# Patient Record
Sex: FEMALE | Race: BLACK OR AFRICAN AMERICAN | NOT HISPANIC OR LATINO | Employment: UNEMPLOYED | URBAN - METROPOLITAN AREA
[De-identification: names, ages, dates, MRNs, and addresses within clinical notes are randomized per-mention and may not be internally consistent; named-entity substitution may affect disease eponyms.]

---

## 2023-04-08 LAB
BASOPHILS (10*3/UL) IN BLOOD BY AUTOMATED COUNT: 0.05 X10E9/L (ref 0–0.1)
BASOPHILS/100 LEUKOCYTES IN BLOOD BY AUTOMATED COUNT: 0.4 % (ref 0–2)
EOSINOPHILS (10*3/UL) IN BLOOD BY AUTOMATED COUNT: 0.02 X10E9/L (ref 0–0.7)
EOSINOPHILS/100 LEUKOCYTES IN BLOOD BY AUTOMATED COUNT: 0.1 % (ref 0–6)
ERYTHROCYTE DISTRIBUTION WIDTH (RATIO) BY AUTOMATED COUNT: 13.7 % (ref 11.5–14.5)
ERYTHROCYTE MEAN CORPUSCULAR HEMOGLOBIN CONCENTRATION (G/DL) BY AUTOMATED: 31.1 G/DL (ref 32–36)
ERYTHROCYTE MEAN CORPUSCULAR VOLUME (FL) BY AUTOMATED COUNT: 89 FL (ref 80–100)
ERYTHROCYTES (10*6/UL) IN BLOOD BY AUTOMATED COUNT: 3.23 X10E12/L (ref 4–5.2)
HEMATOCRIT (%) IN BLOOD BY AUTOMATED COUNT: 28.9 % (ref 36–46)
HEMOGLOBIN (G/DL) IN BLOOD: 9 G/DL (ref 12–16)
IMMATURE GRANULOCYTES/100 LEUKOCYTES IN BLOOD BY AUTOMATED COUNT: 0.4 % (ref 0–0.9)
LEUKOCYTES (10*3/UL) IN BLOOD BY AUTOMATED COUNT: 13.5 X10E9/L (ref 4.4–11.3)
LYMPHOCYTES (10*3/UL) IN BLOOD BY AUTOMATED COUNT: 1.53 X10E9/L (ref 1.2–4.8)
LYMPHOCYTES/100 LEUKOCYTES IN BLOOD BY AUTOMATED COUNT: 11.4 % (ref 13–44)
MONOCYTES (10*3/UL) IN BLOOD BY AUTOMATED COUNT: 0.89 X10E9/L (ref 0.1–1)
MONOCYTES/100 LEUKOCYTES IN BLOOD BY AUTOMATED COUNT: 6.6 % (ref 2–10)
NEUTROPHILS (10*3/UL) IN BLOOD BY AUTOMATED COUNT: 10.91 X10E9/L (ref 1.2–7.7)
NEUTROPHILS/100 LEUKOCYTES IN BLOOD BY AUTOMATED COUNT: 81.1 % (ref 40–80)
PLATELETS (10*3/UL) IN BLOOD AUTOMATED COUNT: 345 X10E9/L (ref 150–450)

## 2023-04-17 ENCOUNTER — NURSING HOME VISIT (OUTPATIENT)
Dept: POST ACUTE CARE | Facility: EXTERNAL LOCATION | Age: 63
End: 2023-04-17
Payer: COMMERCIAL

## 2023-04-17 DIAGNOSIS — A41.9 SEPSIS, DUE TO UNSPECIFIED ORGANISM, UNSPECIFIED WHETHER ACUTE ORGAN DYSFUNCTION PRESENT (MULTI): Primary | ICD-10-CM

## 2023-04-17 DIAGNOSIS — Z74.09 IMMOBILITY: ICD-10-CM

## 2023-04-17 DIAGNOSIS — N18.9 ACUTE KIDNEY INJURY SUPERIMPOSED ON CHRONIC KIDNEY DISEASE (CMS-HCC): ICD-10-CM

## 2023-04-17 DIAGNOSIS — I69.30 SEQUELA OF CEREBROVASCULAR ACCIDENT: ICD-10-CM

## 2023-04-17 DIAGNOSIS — E11.69 TYPE 2 DIABETES MELLITUS WITH OTHER SPECIFIED COMPLICATION, WITH LONG-TERM CURRENT USE OF INSULIN (MULTI): ICD-10-CM

## 2023-04-17 DIAGNOSIS — M62.81 GENERALIZED MUSCLE WEAKNESS: ICD-10-CM

## 2023-04-17 DIAGNOSIS — R47.01 NONVERBAL: ICD-10-CM

## 2023-04-17 DIAGNOSIS — F01.C11 SEVERE VASCULAR DEMENTIA WITH AGITATION (MULTI): ICD-10-CM

## 2023-04-17 DIAGNOSIS — Z79.4 TYPE 2 DIABETES MELLITUS WITH OTHER SPECIFIED COMPLICATION, WITH LONG-TERM CURRENT USE OF INSULIN (MULTI): ICD-10-CM

## 2023-04-17 DIAGNOSIS — Z86.73 HISTORY OF MULTIPLE STROKES: ICD-10-CM

## 2023-04-17 DIAGNOSIS — I10 BENIGN ESSENTIAL HTN: ICD-10-CM

## 2023-04-17 DIAGNOSIS — S31.000S SACRAL WOUND, SEQUELA: ICD-10-CM

## 2023-04-17 DIAGNOSIS — I69.391 DYSPHAGIA DUE TO RECENT CEREBROVASCULAR ACCIDENT: ICD-10-CM

## 2023-04-17 DIAGNOSIS — E78.5 HYPERLIPIDEMIA, UNSPECIFIED HYPERLIPIDEMIA TYPE: ICD-10-CM

## 2023-04-17 DIAGNOSIS — N17.9 ACUTE KIDNEY INJURY SUPERIMPOSED ON CHRONIC KIDNEY DISEASE (CMS-HCC): ICD-10-CM

## 2023-04-17 DIAGNOSIS — E46 MALNUTRITION, UNSPECIFIED TYPE (MULTI): ICD-10-CM

## 2023-04-17 PROCEDURE — 99310 SBSQ NF CARE HIGH MDM 45: CPT | Performed by: NURSE PRACTITIONER

## 2023-04-17 NOTE — LETTER
Patient: Kira Cha  : 1960    Encounter Date: 2023    Name: Kira Cha    YOB: 1960    Code Status: Full Code    Chief Complaint:   New patient; initial visit;   Sepsis; etc.....      HPI    HOSPITAL COURSE:  62-year-old female with past medical history of stroke, vascular dementia, type 2 diabetes, hypertension, and CKD.   She was sent to [facility] with fever and hypoxia ON 23.    Per EMS they were called because the patient was hypoxic.  EMS reported that the patient was on 3 L of oxygen via nasal cannula on their arrival and was satting in the mid 80s.  They placed her on a nonrebreather and her oxygenation improved.  Per EMS patient also had a recent admission to another hospital for change in mental status and concern for stroke.  EMS reports that the patient is nonverbal.  Patient provides no history on arrival to the ER.  The patient also presented to the ER on 3/22/2023 from [facility] for decreased responsiveness.  CT CTA revealed left ICA stenosis.  Otherwise no acute process.  Patient was then transferred to [facility] per recommendation from neurology.  MRI during the hospital stay did not have evidence of an acute process per radiology.  Patient was diagnosed with acute metabolic encephalopathy.  Corpak was placed during her admission.   On 2023 she was febrile with a temperature of 38.8 Celsius and heart rate of 124.  There was a concern for sepsis.  IV antibiotics were initiated.  There was suspicion that the source of infection was her wound infection.  It was not thought that she had necrotizing fasciitis.  Patient's lactate increased mildly, so IV fluid resuscitation was initiated.  She was admitted to the floor for antibiotics and other treatments.  CT of the abdomen and pelvis was performed.   Infectious disease was consulted.    Patient admitted to the [facility] on 23 for skilled services    [facility] and chronic  diagnoses include:    Sepsis:  Treated with IV antibiotics.  Treated with vancomycin and cefepime IV.  Fever and leukocytosis were secondary to sepsis.  Worsening of sacral decubitus ulcer, infectious disease was consulted.  Patient is in bed.  She is nonverbal.   No agitation reported.   No fevers reported since she admitted to the [facility].  No cough or oxygen desaturations reported.     Sequela of CVA with residual weakness; multiple strokes with deconditioning;  dysphagia; patient nonverbal:  Patient has corepack in place; has left-sided weakness and contracture.    ROBERT on CKD:   Treated with IV fluids    Benign essential hypertension:  On metoprolol.  Recent /79.    ICA stenosis: Was found during neuro work-up at [facility]    Unstageable sacral decubitus ulcer:  General surgery was consulted for sacral decubitus ulcer that is unstageable due to overlying eschar.  Wound was not draining.  Wound size was documented approximately as 10 cm x 8 cm.  Debridement of the wound was discussed there was not anybody to contact for consent.  Plan for wound was daily wound dressing change with Dakin wet-to-dry twice a day possible debridement.  Currently at the [facility] and is being followed by in-house wound team.    Type 2 diabetes:   On insulin glargine 15 units SQ daily and insulin lispro sliding scale.  Recent accuchecks:   282 mg/dL, 210 mg/dL, 234 mg/dL.   1/15/23 HA1c 9.4 %.    Vascular dementia; agitation:  On risperidone.  No agitation reported since patient has arrived to the [facility].    Malnutrition of mild degree:  On Corpak feeds via right nostril.  On Isosource 1.5 continuous feeds at 50 cc an hour.    Immobility; generalized muscle weakness:  Patient is bedbound.  Unable to ambulate.    Hyperlipidemia:  On atorvastatin.        Reviewed [facility] hospital records.  Reviewed  EMR.  Reviewed medical, social, surgical and family history.  Reviewed all current medications and  performed medication reconciliation.  Reviewed vital signs and blood work results.   Reviewed [facility] Care Documentation  Discussed patient with nursing.     ROS: Limited ROS due to patient's mentation and she is nonverbal; Negative unless noted in HPI.     Surgical History   · History of Pilonidal Cyst Resection    Family History  Mother    · Family history of    · Family history of cataracts (V19.19) (Z83.518)   · Family history of diabetes mellitus (V18.0) (Z83.3)   · Family history of glaucoma (V19.11) (Z83.511)   · Family history of hypertension (V17.49) (Z82.49)  Father    · Family history of    · Family history of diabetes mellitus (V18.0) (Z83.3)   · Family history of hypertension (V17.49) (Z82.49)  Sister    · Family history of diabetes mellitus (V18.0) (Z83.3)   · Family history of hypertension (V17.49) (Z82.49)  Brother    · Family history of    · Family history of cardiac disorder (V17.49) (Z82.49)   · Family history of colon cancer (V16.0) (Z80.0)   · multiple 1st degree relatives   · Family history of diabetes mellitus (V18.0) (Z83.3)   · Family history of hypertension (V17.49) (Z82.49)   · Family history of skin cancer (V16.8) (Z80.8)  Other    · Family history of cardiac disorder (V17.49) (Z82.49)   · Family history of colon cancer (V16.0) (Z80.0)   · multiple 1st degree relatives   · Family history of diabetes mellitus (V18.0) (Z83.3)   · Family history of hypertension (V17.49) (Z82.49)   · Family history of malignant neoplasm (V16.9) (Z80.9)  Unknown    · Family history of cardiac disorder (V17.49) (Z82.49)   · Family history of diabetes mellitus (V18.0) (Z83.3)   · Family history of hypertension (V17.49) (Z82.49)   · Family history of kidney disease (V18.69) (Z84.1)   · Family history of liver disease (V18.59) (Z83.79)   · Family history of malignant neoplasm (V16.9) (Z80.9)    Social History   · Alcohol use (V49.89) (Z78.9)   · Current every day smoker (305.1)  "(F17.200)   · Does not exercise (V69.0) (Z72.3)   · Illicit drug use (305.90) (F19.90)   · History of No alcohol use   · History of Social alcohol use    Allergies  Medication    · Penicillins      Past Medical History:   Diagnosis Date   • Cerebral infarction, unspecified (CMS/HCC) 08/25/2017    Left pontine stroke   • Personal history of other diseases of the circulatory system     History of hypertension   • Personal history of other diseases of the digestive system     History of dental problems   • Personal history of other mental and behavioral disorders     History of depression   • Sleep disorder, unspecified     Sleep disturbances   • Unspecified disorder of nose and nasal sinuses     Sinus trouble           Past Surgical History:   Procedure Laterality Date   • MR HEAD ANGIO WO IV CONTRAST  7/14/2017    MR HEAD ANGIO WO IV CONTRAST 7/14/2017 [facility] CLINICAL LEGACY   • MR HEAD ANGIO WO IV CONTRAST  11/29/2022    MR HEAD ANGIO WO IV CONTRAST 11/29/2022 DOCTOR OFFICE LEGACY   • MR NECK ANGIO WO IV CONTRAST  7/14/2017    MR NECK ANGIO WO IV CONTRAST 7/14/2017 [facility] CLINICAL LEGACY   • MR NECK ANGIO WO IV CONTRAST  11/29/2022    MR NECK ANGIO WO IV CONTRAST 11/29/2022 DOCTOR OFFICE LEGACY   • PILONIDAL CYST DRAINAGE  09/27/2017    Pilonidal Cyst Resection          Lab Results   Component Value Date    WBC 13.5 (H) 04/08/2023    HGB 9.0 (L) 04/08/2023    HCT 28.9 (L) 04/08/2023     04/08/2023    CHOL 313 (H) 11/28/2022    TRIG 248 (H) 11/28/2022    HDL 52.7 11/28/2022    ALT 8 11/28/2022    AST 8 (L) 11/28/2022     01/17/2023    K 3.7 01/17/2023     01/17/2023    CREATININE 2.07 (H) 01/17/2023    BUN 39 (H) 01/17/2023    CO2 27 01/17/2023    TSH 1.91 01/10/2023    INR 1.0 11/28/2022    HGBA1C 9.4 (A) 01/15/2023         /79   Pulse 89   Temp 36.6 °C (97.8 °F)   Resp 20   Ht 1.626 m (5' 4\")   Wt 84.8 kg (186 lb 15.9 oz)   SpO2 98%   BMI 32.10 kg/m²      Physical Exam  Vitals and " nursing note reviewed.   Constitutional:       Appearance: She is obese. She is ill-appearing.      Comments: Nonverbal   HENT:      Head: Normocephalic.      Right Ear: External ear normal.      Left Ear: External ear normal.      Nose: Nose normal.      Mouth/Throat:      Mouth: Mucous membranes are moist.      Pharynx: Oropharynx is clear.   Eyes:      Extraocular Movements: Extraocular movements intact.      Conjunctiva/sclera: Conjunctivae normal.      Pupils: Pupils are equal, round, and reactive to light.   Cardiovascular:      Rate and Rhythm: Normal rate and regular rhythm.      Pulses: Normal pulses.      Heart sounds: Normal heart sounds.   Pulmonary:      Effort: Pulmonary effort is normal.      Breath sounds: Normal breath sounds.   Abdominal:      General: Bowel sounds are normal.      Palpations: Abdomen is soft.   Musculoskeletal:      Cervical back: Normal range of motion and neck supple.      Comments: Generalized muscle weakness.    Immobility.   Skin:     General: Skin is warm and dry.      Comments: Sacral wound--see in house wound team notes in [facility]   Neurological:      Mental Status: She is lethargic and disoriented.      Motor: Weakness present.      Coordination: Coordination abnormal.      Gait: Gait abnormal.      Comments: Nonverbal.    Left side contracture and weakness.   Psychiatric:         Mood and Affect: Affect is inappropriate.         Speech: She is noncommunicative.         Cognition and Memory: Cognition is impaired. Memory is impaired.         Judgment: Judgment is inappropriate.          Assessment/Plan     Sepsis:  Treated with IV antibiotics.  Treated with vancomycin and cefepime IV.  Monitor for fevers, leukocytosis.    Sequela of CVA with residual weakness; multiple strokes with deconditioning;  dysphagia; patient nonverbal:  Continue skilled services PT/OT/ST at the [facility].  Patient has corepack in place; has left-sided weakness and contracture.    ROBERT on CKD:   Treated  with IV fluids in the hospital.  Monitor renal function.    Benign essential hypertension:  Continue metoprolol.  Monitor Bps.    ICA stenosis:   Was found during neuro work-up at [facility].  Follow up with neurology.    Unstageable sacral decubitus ulcer:  General surgery was consulted for sacral decubitus ulcer that is unstageable due to overlying eschar.  Wound was not draining.  Wound size was documented approximately as 10 cm x 8 cm.  Debridement of the wound was discussed there was not anybody to contact for consent.  Plan for wound was daily wound dressing change with Dakin wet-to-dry twice a day possible debridement.  Currently at the [facility] and is being followed by in-house wound team.    Type 2 diabetes:   Continue insulin glargine 15 units SQ daily and insulin lispro sliding scale.  Monitor accuchecks.  Monitor Ha1c.     Vascular dementia; agitation:  Continue risperidone.  Monitor for agitation.    Malnutrition of mild degree:  On Corpak feeds via right nostril.  Continue Isosource 1.5 continuous feeds at 50 cc an hour.    Immobility; generalized muscle weakness:  Patient is bedbound.  Unable to ambulate.  Fall prevention strategies.     Hyperlipidemia:  Continue atorvastatin.        Problem List Items Addressed This Visit    None  Visit Diagnoses       Sepsis, due to unspecified organism, unspecified whether acute organ dysfunction present (CMS/HCC)    -  Primary    Sequela of cerebrovascular accident        Dysphagia due to recent cerebrovascular accident        Acute kidney injury superimposed on chronic kidney disease (CMS/HCC)        Benign essential HTN        Sacral wound, sequela        Type 2 diabetes mellitus with other specified complication, with long-term current use of insulin (CMS/HCC)        Severe vascular dementia with agitation (CMS/HCC)        Nonverbal        Malnutrition, unspecified type (CMS/HCC)        History of multiple strokes        Immobility         Generalized muscle weakness        Hyperlipidemia, unspecified hyperlipidemia type                RAMIRO Ariza       Electronically Signed By: RAMIRO Ariza   4/20/23 10:09 PM

## 2023-04-20 VITALS
DIASTOLIC BLOOD PRESSURE: 79 MMHG | RESPIRATION RATE: 20 BRPM | HEIGHT: 64 IN | WEIGHT: 186.99 LBS | TEMPERATURE: 97.8 F | SYSTOLIC BLOOD PRESSURE: 148 MMHG | HEART RATE: 89 BPM | BODY MASS INDEX: 31.92 KG/M2 | OXYGEN SATURATION: 98 %

## 2023-04-21 NOTE — PROGRESS NOTES
Name: Kira Cha    YOB: 1960    Code Status: Full Code    Chief Complaint:   New patient; initial visit;   Sepsis; etc.....      HPI    HOSPITAL COURSE:  62-year-old female with past medical history of stroke, vascular dementia, type 2 diabetes, hypertension, and CKD.   She was sent to [facility] with fever and hypoxia ON 4/9/23.    Per EMS they were called because the patient was hypoxic.  EMS reported that the patient was on 3 L of oxygen via nasal cannula on their arrival and was satting in the mid 80s.  They placed her on a nonrebreather and her oxygenation improved.  Per EMS patient also had a recent admission to another hospital for change in mental status and concern for stroke.  EMS reports that the patient is nonverbal.  Patient provides no history on arrival to the ER.  The patient also presented to the ER on 3/22/2023 from [facility] for decreased responsiveness.  CT CTA revealed left ICA stenosis.  Otherwise no acute process.  Patient was then transferred to [facility] per recommendation from neurology.  MRI during the hospital stay did not have evidence of an acute process per radiology.  Patient was diagnosed with acute metabolic encephalopathy.  Corpak was placed during her admission.   On 4/9/2023 she was febrile with a temperature of 38.8 Celsius and heart rate of 124.  There was a concern for sepsis.  IV antibiotics were initiated.  There was suspicion that the source of infection was her wound infection.  It was not thought that she had necrotizing fasciitis.  Patient's lactate increased mildly, so IV fluid resuscitation was initiated.  She was admitted to the floor for antibiotics and other treatments.  CT of the abdomen and pelvis was performed.   Infectious disease was consulted.    Patient admitted to the [facility] on 4/14/23 for skilled services    Hospital and chronic diagnoses include:    Sepsis:  Treated with IV antibiotics.  Treated  with vancomycin and cefepime IV.  Fever and leukocytosis were secondary to sepsis.  Worsening of sacral decubitus ulcer, infectious disease was consulted.  Patient is in bed.  She is nonverbal.   No agitation reported.   No fevers reported since she admitted to the [facility].  No cough or oxygen desaturations reported.     Sequela of CVA with residual weakness; multiple strokes with deconditioning;  dysphagia; patient nonverbal:  Patient has corepack in place; has left-sided weakness and contracture.    ROBERT on CKD:   Treated with IV fluids    Benign essential hypertension:  On metoprolol.  Recent /79.    ICA stenosis: Was found during neuro work-up at [facility]    Unstageable sacral decubitus ulcer:  General surgery was consulted for sacral decubitus ulcer that is unstageable due to overlying eschar.  Wound was not draining.  Wound size was documented approximately as 10 cm x 8 cm.  Debridement of the wound was discussed there was not anybody to contact for consent.  Plan for wound was daily wound dressing change with Dakin wet-to-dry twice a day possible debridement.  Currently at the [facility] and is being followed by in-house wound team.    Type 2 diabetes:   On insulin glargine 15 units SQ daily and insulin lispro sliding scale.  Recent accuchecks:   282 mg/dL, 210 mg/dL, 234 mg/dL.   1/15/23 HA1c 9.4 %.    Vascular dementia; agitation:  On risperidone.  No agitation reported since patient has arrived to the [facility].    Malnutrition of mild degree:  On Corpak feeds via right nostril.  On Isosource 1.5 continuous feeds at 50 cc an hour.    Immobility; generalized muscle weakness:  Patient is bedbound.  Unable to ambulate.    Hyperlipidemia:  On atorvastatin.        Reviewed [facility] hospital records.  Reviewed  EMR.  Reviewed medical, social, surgical and family history.  Reviewed all current medications and performed medication reconciliation.  Reviewed vital signs and blood  work results.   Reviewed [facility] Documentation  Discussed patient with nursing.     ROS: Limited ROS due to patient's mentation and she is nonverbal; Negative unless noted in HPI.     Surgical History   · History of Pilonidal Cyst Resection    Family History  Mother    · Family history of    · Family history of cataracts (V19.19) (Z83.518)   · Family history of diabetes mellitus (V18.0) (Z83.3)   · Family history of glaucoma (V19.11) (Z83.511)   · Family history of hypertension (V17.49) (Z82.49)  Father    · Family history of    · Family history of diabetes mellitus (V18.0) (Z83.3)   · Family history of hypertension (V17.49) (Z82.49)  Sister    · Family history of diabetes mellitus (V18.0) (Z83.3)   · Family history of hypertension (V17.49) (Z82.49)  Brother    · Family history of    · Family history of cardiac disorder (V17.49) (Z82.49)   · Family history of colon cancer (V16.0) (Z80.0)   · multiple 1st degree relatives   · Family history of diabetes mellitus (V18.0) (Z83.3)   · Family history of hypertension (V17.49) (Z82.49)   · Family history of skin cancer (V16.8) (Z80.8)  Other    · Family history of cardiac disorder (V17.49) (Z82.49)   · Family history of colon cancer (V16.0) (Z80.0)   · multiple 1st degree relatives   · Family history of diabetes mellitus (V18.0) (Z83.3)   · Family history of hypertension (V17.49) (Z82.49)   · Family history of malignant neoplasm (V16.9) (Z80.9)  Unknown    · Family history of cardiac disorder (V17.49) (Z82.49)   · Family history of diabetes mellitus (V18.0) (Z83.3)   · Family history of hypertension (V17.49) (Z82.49)   · Family history of kidney disease (V18.69) (Z84.1)   · Family history of liver disease (V18.59) (Z83.79)   · Family history of malignant neoplasm (V16.9) (Z80.9)    Social History   · Alcohol use (V49.89) (Z78.9)   · Current every day smoker (305.1) (F17.200)   · Does not exercise (V69.0) (Z72.3)   · Illicit drug use  "(305.90) (F19.90)   · History of No alcohol use   · History of Social alcohol use    Allergies  Medication    · Penicillins      Past Medical History:   Diagnosis Date    Cerebral infarction, unspecified (CMS/HCC) 08/25/2017    Left pontine stroke    Personal history of other diseases of the circulatory system     History of hypertension    Personal history of other diseases of the digestive system     History of dental problems    Personal history of other mental and behavioral disorders     History of depression    Sleep disorder, unspecified     Sleep disturbances    Unspecified disorder of nose and nasal sinuses     Sinus trouble           Past Surgical History:   Procedure Laterality Date    MR HEAD ANGIO WO IV CONTRAST  7/14/2017    MR HEAD ANGIO WO IV CONTRAST 7/14/2017 [facility] CLINICAL LEGACY    MR HEAD ANGIO WO IV CONTRAST  11/29/2022    MR HEAD ANGIO WO IV CONTRAST 11/29/2022 DOCTOR OFFICE LEGACY    MR NECK ANGIO WO IV CONTRAST  7/14/2017    MR NECK ANGIO WO IV CONTRAST 7/14/2017 [facility] CLINICAL LEGACY    MR NECK ANGIO WO IV CONTRAST  11/29/2022    MR NECK ANGIO WO IV CONTRAST 11/29/2022 DOCTOR OFFICE LEGACY    PILONIDAL CYST DRAINAGE  09/27/2017    Pilonidal Cyst Resection          Lab Results   Component Value Date    WBC 13.5 (H) 04/08/2023    HGB 9.0 (L) 04/08/2023    HCT 28.9 (L) 04/08/2023     04/08/2023    CHOL 313 (H) 11/28/2022    TRIG 248 (H) 11/28/2022    HDL 52.7 11/28/2022    ALT 8 11/28/2022    AST 8 (L) 11/28/2022     01/17/2023    K 3.7 01/17/2023     01/17/2023    CREATININE 2.07 (H) 01/17/2023    BUN 39 (H) 01/17/2023    CO2 27 01/17/2023    TSH 1.91 01/10/2023    INR 1.0 11/28/2022    HGBA1C 9.4 (A) 01/15/2023         /79   Pulse 89   Temp 36.6 °C (97.8 °F)   Resp 20   Ht 1.626 m (5' 4\")   Wt 84.8 kg (186 lb 15.9 oz)   SpO2 98%   BMI 32.10 kg/m²      Physical Exam  Vitals and nursing note reviewed.   Constitutional:       Appearance: She is obese. She is " ill-appearing.      Comments: Nonverbal   HENT:      Head: Normocephalic.      Right Ear: External ear normal.      Left Ear: External ear normal.      Nose: Nose normal.      Mouth/Throat:      Mouth: Mucous membranes are moist.      Pharynx: Oropharynx is clear.   Eyes:      Extraocular Movements: Extraocular movements intact.      Conjunctiva/sclera: Conjunctivae normal.      Pupils: Pupils are equal, round, and reactive to light.   Cardiovascular:      Rate and Rhythm: Normal rate and regular rhythm.      Pulses: Normal pulses.      Heart sounds: Normal heart sounds.   Pulmonary:      Effort: Pulmonary effort is normal.      Breath sounds: Normal breath sounds.   Abdominal:      General: Bowel sounds are normal.      Palpations: Abdomen is soft.   Musculoskeletal:      Cervical back: Normal range of motion and neck supple.      Comments: Generalized muscle weakness.    Immobility.   Skin:     General: Skin is warm and dry.      Comments: Sacral wound--see in house wound team notes in [facility]   Neurological:      Mental Status: She is lethargic and disoriented.      Motor: Weakness present.      Coordination: Coordination abnormal.      Gait: Gait abnormal.      Comments: Nonverbal.    Left side contracture and weakness.   Psychiatric:         Mood and Affect: Affect is inappropriate.         Speech: She is noncommunicative.         Cognition and Memory: Cognition is impaired. Memory is impaired.         Judgment: Judgment is inappropriate.          Assessment/Plan      Sepsis:  Treated with IV antibiotics.  Treated with vancomycin and cefepime IV.  Monitor for fevers, leukocytosis.    Sequela of CVA with residual weakness; multiple strokes with deconditioning;  dysphagia; patient nonverbal:  Continue skilled services PT/OT/ST at the [facility].  Patient has corepack in place; has left-sided weakness and contracture.    ROBERT on CKD:   Treated with IV fluids in the hospital.  Monitor renal function.    Benign essential  hypertension:  Continue metoprolol.  Monitor Bps.    ICA stenosis:   Was found during neuro work-up at [facility].  Follow up with neurology.    Unstageable sacral decubitus ulcer:  General surgery was consulted for sacral decubitus ulcer that is unstageable due to overlying eschar.  Wound was not draining.  Wound size was documented approximately as 10 cm x 8 cm.  Debridement of the wound was discussed there was not anybody to contact for consent.  Plan for wound was daily wound dressing change with Dakin wet-to-dry twice a day possible debridement.  Currently at the [facility] and is being followed by in-house wound team.    Type 2 diabetes:   Continue insulin glargine 15 units SQ daily and insulin lispro sliding scale.  Monitor accuchecks.  Monitor Ha1c.     Vascular dementia; agitation:  Continue risperidone.  Monitor for agitation.    Malnutrition of mild degree:  On Corpak feeds via right nostril.  Continue Isosource 1.5 continuous feeds at 50 cc an hour.    Immobility; generalized muscle weakness:  Patient is bedbound.  Unable to ambulate.  Fall prevention strategies.     Hyperlipidemia:  Continue atorvastatin.        Problem List Items Addressed This Visit    None  Visit Diagnoses       Sepsis, due to unspecified organism, unspecified whether acute organ dysfunction present (CMS/HCC)    -  Primary    Sequela of cerebrovascular accident        Dysphagia due to recent cerebrovascular accident        Acute kidney injury superimposed on chronic kidney disease (CMS/HCC)        Benign essential HTN        Sacral wound, sequela        Type 2 diabetes mellitus with other specified complication, with long-term current use of insulin (CMS/HCC)        Severe vascular dementia with agitation (CMS/HCC)        Nonverbal        Malnutrition, unspecified type (CMS/HCC)        History of multiple strokes        Immobility        Generalized muscle weakness        Hyperlipidemia, unspecified hyperlipidemia type                 Meaghan Garcia, APRN-CNP

## 2023-04-24 ENCOUNTER — NURSING HOME VISIT (OUTPATIENT)
Dept: POST ACUTE CARE | Facility: EXTERNAL LOCATION | Age: 63
End: 2023-04-24
Payer: COMMERCIAL

## 2023-04-24 DIAGNOSIS — I69.391 DYSPHAGIA DUE TO RECENT CEREBROVASCULAR ACCIDENT: ICD-10-CM

## 2023-04-24 DIAGNOSIS — Z86.73 HISTORY OF MULTIPLE STROKES: ICD-10-CM

## 2023-04-24 DIAGNOSIS — E11.69 TYPE 2 DIABETES MELLITUS WITH OTHER SPECIFIED COMPLICATION, WITH LONG-TERM CURRENT USE OF INSULIN (MULTI): Primary | ICD-10-CM

## 2023-04-24 DIAGNOSIS — N18.9 ACUTE KIDNEY INJURY SUPERIMPOSED ON CHRONIC KIDNEY DISEASE (CMS-HCC): ICD-10-CM

## 2023-04-24 DIAGNOSIS — R53.1 LEFT-SIDED WEAKNESS: ICD-10-CM

## 2023-04-24 DIAGNOSIS — Z79.4 TYPE 2 DIABETES MELLITUS WITH OTHER SPECIFIED COMPLICATION, WITH LONG-TERM CURRENT USE OF INSULIN (MULTI): Primary | ICD-10-CM

## 2023-04-24 DIAGNOSIS — R47.01 NONVERBAL: ICD-10-CM

## 2023-04-24 DIAGNOSIS — F01.C11 SEVERE VASCULAR DEMENTIA WITH AGITATION (MULTI): ICD-10-CM

## 2023-04-24 DIAGNOSIS — N17.9 ACUTE KIDNEY INJURY SUPERIMPOSED ON CHRONIC KIDNEY DISEASE (CMS-HCC): ICD-10-CM

## 2023-04-24 DIAGNOSIS — M62.81 GENERALIZED MUSCLE WEAKNESS: ICD-10-CM

## 2023-04-24 DIAGNOSIS — I69.30 SEQUELA OF CEREBROVASCULAR ACCIDENT: ICD-10-CM

## 2023-04-24 DIAGNOSIS — E46 MALNUTRITION, UNSPECIFIED TYPE (MULTI): ICD-10-CM

## 2023-04-24 DIAGNOSIS — Z74.09 IMMOBILITY: ICD-10-CM

## 2023-04-24 DIAGNOSIS — S31.000S SACRAL WOUND, SEQUELA: ICD-10-CM

## 2023-04-24 DIAGNOSIS — I10 BENIGN ESSENTIAL HTN: ICD-10-CM

## 2023-04-24 PROCEDURE — 99309 SBSQ NF CARE MODERATE MDM 30: CPT | Performed by: NURSE PRACTITIONER

## 2023-04-24 NOTE — LETTER
Patient: Kira Cha  : 1960    Encounter Date: 2023    Name: Kira Cha    YOB: 1960    Code Status: Full Code    Chief Complaint:   Follow up on DM 2; etc....    Patient admitted to the [facility] on 23 for skilled services    HPI      62-year-old female with past medical history of stroke, vascular dementia, type 2 diabetes, hypertension, and CKD.     Comorbidities:     Type 2 diabetes:   On insulin glargine 15 units SQ daily and insulin lispro sliding scale.  Recent accuchecks:  159  mg/dL, 175 mg/dL, 210 mg/dL.   1/15/23 HA1c 9.4 %.  No episodes of hypoglycemia reported.   Patient is in bed.  She is nonverbal.   No agitation reported.   No fevers reported since she admitted to the [facility].  No cough or oxygen desaturations reported.     Sequela of CVA with residual weakness; ICA stenosis; multiple strokes with deconditioning;  dysphagia; patient nonverbal:  Patient has corepack in place; has left-sided weakness and contracture.  ICA stenosis was found during neuro work-up at [facility]    ROBERT on CKD:   Treated with IV fluids in the hospital.   Currently has corepack in place.     Benign essential hypertension:  On metoprolol.  Recent /65.      Unstageable sacral decubitus ulcer:  General surgery was consulted in the hospital for sacral decubitus ulcer that is unstageable due to overlying eschar.  Wound was not draining.  Wound size was documented approximately as 10 cm x 8 cm.  Plan for wound is daily wound dressing change with Dakin wet-to-dry twice a day.  Currently at the [facility] and is being followed by in-house wound team.    Vascular dementia; agitation:  On risperidone.  No agitation reported since patient has arrived to the [facility].    Malnutrition of mild degree:  On Corpak feeds via right nostril.  On Isosource 1.5 continuous feeds at 50 cc an hour.    Immobility; generalized muscle weakness:  Patient is bedbound.  Unable to  ambulate.          Reviewed [facility] and [facility] records.  Reviewed  EMR.  Reviewed medical, social, surgical and family history.  Reviewed all current medications and performed medication reconciliation.  Reviewed vital signs AND recent blood work results.   Reviewed [facility] Care Documentation  Discussed patient with nursing and NP Kat.    ROS: Limited ROS due to patient's mentation and she is nonverbal; Negative unless noted in HPI.     Labs:  BMP: Date: 2023 Na 150 K 4.6 Cr 1.1 BUN 63 glucose 234 Ca 8.5 GFR 61  CBC: Date: 2023 WBC 9.6 Hgb 8.4 hematocrit 26.1 platelets 256      Surgical History   · History of Pilonidal Cyst Resection    Family History  Mother    · Family history of    · Family history of cataracts (V19.19) (Z83.518)   · Family history of diabetes mellitus (V18.0) (Z83.3)   · Family history of glaucoma (V19.11) (Z83.511)   · Family history of hypertension (V17.49) (Z82.49)  Father    · Family history of    · Family history of diabetes mellitus (V18.0) (Z83.3)   · Family history of hypertension (V17.49) (Z82.49)  Sister    · Family history of diabetes mellitus (V18.0) (Z83.3)   · Family history of hypertension (V17.49) (Z82.49)  Brother    · Family history of    · Family history of cardiac disorder (V17.49) (Z82.49)   · Family history of colon cancer (V16.0) (Z80.0)   · multiple 1st degree relatives   · Family history of diabetes mellitus (V18.0) (Z83.3)   · Family history of hypertension (V17.49) (Z82.49)   · Family history of skin cancer (V16.8) (Z80.8)  Other    · Family history of cardiac disorder (V17.49) (Z82.49)   · Family history of colon cancer (V16.0) (Z80.0)   · multiple 1st degree relatives   · Family history of diabetes mellitus (V18.0) (Z83.3)   · Family history of hypertension (V17.49) (Z82.49)   · Family history of malignant neoplasm (V16.9) (Z80.9)  Unknown    · Family history of cardiac disorder (V17.49) (Z82.49)   · Family  history of diabetes mellitus (V18.0) (Z83.3)   · Family history of hypertension (V17.49) (Z82.49)   · Family history of kidney disease (V18.69) (Z84.1)   · Family history of liver disease (V18.59) (Z83.79)   · Family history of malignant neoplasm (V16.9) (Z80.9)    Social History   · Alcohol use (V49.89) (Z78.9)   · Current every day smoker (305.1) (F17.200)   · Does not exercise (V69.0) (Z72.3)   · Illicit drug use (305.90) (F19.90)   · History of No alcohol use   · History of Social alcohol use    Allergies  Medication    · Penicillins      Past Medical History:   Diagnosis Date   • Cerebral infarction, unspecified (CMS/HCC) 08/25/2017    Left pontine stroke   • Personal history of other diseases of the circulatory system     History of hypertension   • Personal history of other diseases of the digestive system     History of dental problems   • Personal history of other mental and behavioral disorders     History of depression   • Sleep disorder, unspecified     Sleep disturbances   • Unspecified disorder of nose and nasal sinuses     Sinus trouble           Past Surgical History:   Procedure Laterality Date   • MR HEAD ANGIO WO IV CONTRAST  7/14/2017    MR HEAD ANGIO WO IV CONTRAST 7/14/2017 [facility] CLINICAL LEGACY   • MR HEAD ANGIO WO IV CONTRAST  11/29/2022    MR HEAD ANGIO WO IV CONTRAST 11/29/2022 DOCTOR OFFICE LEGACY   • MR NECK ANGIO WO IV CONTRAST  7/14/2017    MR NECK ANGIO WO IV CONTRAST 7/14/2017 [facility] CLINICAL LEGACY   • MR NECK ANGIO WO IV CONTRAST  11/29/2022    MR NECK ANGIO WO IV CONTRAST 11/29/2022 DOCTOR OFFICE LEGACY   • PILONIDAL CYST DRAINAGE  09/27/2017    Pilonidal Cyst Resection          Lab Results   Component Value Date    WBC 13.5 (H) 04/08/2023    HGB 9.0 (L) 04/08/2023    HCT 28.9 (L) 04/08/2023     04/08/2023    CHOL 313 (H) 11/28/2022    TRIG 248 (H) 11/28/2022    HDL 52.7 11/28/2022    ALT 8 11/28/2022    AST 8 (L) 11/28/2022     01/17/2023    K 3.7 01/17/2023      "01/17/2023    CREATININE 2.07 (H) 01/17/2023    BUN 39 (H) 01/17/2023    CO2 27 01/17/2023    TSH 1.91 01/10/2023    INR 1.0 11/28/2022    HGBA1C 9.4 (A) 01/15/2023         /65   Pulse 100   Temp 36.8 °C (98.2 °F)   Resp 16   Ht 1.626 m (5' 4\")   Wt 84.8 kg (187 lb)   SpO2 96%   BMI 32.10 kg/m²      Physical Exam  Vitals and nursing note reviewed.   Constitutional:       Appearance: She is obese. She is ill-appearing.      Comments: Nonverbal   HENT:      Head: Normocephalic.      Right Ear: External ear normal.      Left Ear: External ear normal.      Nose: Nose normal.      Mouth/Throat:      Mouth: Mucous membranes are moist.      Pharynx: Oropharynx is clear.   Eyes:      Extraocular Movements: Extraocular movements intact.      Conjunctiva/sclera: Conjunctivae normal.      Pupils: Pupils are equal, round, and reactive to light.   Cardiovascular:      Rate and Rhythm: Normal rate and regular rhythm.      Pulses: Normal pulses.      Heart sounds: Normal heart sounds.   Pulmonary:      Effort: Pulmonary effort is normal.      Breath sounds: Normal breath sounds.   Abdominal:      General: Bowel sounds are normal.      Palpations: Abdomen is soft.   Musculoskeletal:      Cervical back: Normal range of motion and neck supple.      Comments: Generalized muscle weakness.    Immobility.    Left sided weakness (present on admission)   Skin:     General: Skin is warm and dry.      Comments: Sacral wound--see in house wound team notes in [facility]   Neurological:      Mental Status: She is lethargic and disoriented.      Motor: Weakness present.      Coordination: Coordination abnormal.      Gait: Gait abnormal.      Comments: Nonverbal.    Left side contracture and weakness.   Psychiatric:         Mood and Affect: Affect is inappropriate.         Speech: She is noncommunicative.         Cognition and Memory: Cognition is impaired. Memory is impaired.         Judgment: Judgment is inappropriate.      "     Assessment/Plan     Ordered CMP and CBC with diff. For tomorrow.    Type 2 diabetes:   Continue insulin glargine 15 units SQ daily and insulin lispro sliding scale.  Monitor accuchecks.  Monitor Ha1c Q 3 months.  Monitor for hypoglycemia.    Sequela of CVA with residual weakness; ICA stenosis; multiple strokes with deconditioning;  dysphagia; patient nonverbal:  Patient has corepack in place; has left-sided weakness and contracture.  ICA stenosis was found during neuro work-up at [facility]    ROBERT on CKD:   Treated with IV fluids in the hospital.   Currently has corepack in place for fluids.    Benign essential hypertension:  Continue metoprolol.  Monitor Bps.    Unstageable sacral decubitus ulcer:  Plan for wound is daily wound dressing change with Dakin wet-to-dry twice a day.  Continue to be followed by in-house wound team at the [facility].    Vascular dementia; agitation:  Continue risperidone.  Monitor for agitation.    Malnutrition of mild degree:  Continue Corpak feeds via right nostril.  Continue Isosource 1.5 continuous feeds at 50 cc an hour.    Immobility; generalized muscle weakness:  Patient is bedbound.  Unable to ambulate.  Fall prevention strategies.             Problem List Items Addressed This Visit    None  Visit Diagnoses       Type 2 diabetes mellitus with other specified complication, with long-term current use of insulin (CMS/HCC)    -  Primary    Sequela of cerebrovascular accident        Left-sided weakness        Dysphagia due to recent cerebrovascular accident        Acute kidney injury superimposed on chronic kidney disease (CMS/HCC)        Benign essential HTN        Sacral wound, sequela        Malnutrition, unspecified type (CMS/HCC)        Nonverbal        Severe vascular dementia with agitation (CMS/HCC)        Generalized muscle weakness        Immobility        History of multiple strokes              LANCE Ariza-CNP       Electronically Signed By: Meaghan Garcia  LANCE-CNP   4/26/23  5:47 PM

## 2023-04-26 VITALS
HEART RATE: 100 BPM | HEIGHT: 64 IN | TEMPERATURE: 98.2 F | WEIGHT: 187 LBS | DIASTOLIC BLOOD PRESSURE: 65 MMHG | BODY MASS INDEX: 31.92 KG/M2 | SYSTOLIC BLOOD PRESSURE: 100 MMHG | RESPIRATION RATE: 16 BRPM | OXYGEN SATURATION: 96 %

## 2023-04-26 NOTE — PROGRESS NOTES
Name: Kira Cha    YOB: 1960    Code Status: Full Code    Chief Complaint:   Follow up on DM 2; etc....    Patient admitted to the [facility] on 4/14/23 for skilled services    HPI      62-year-old female with past medical history of stroke, vascular dementia, type 2 diabetes, hypertension, and CKD.     Comorbidities:     Type 2 diabetes:   On insulin glargine 15 units SQ daily and insulin lispro sliding scale.  Recent accuchecks:  159  mg/dL, 175 mg/dL, 210 mg/dL.   1/15/23 HA1c 9.4 %.  No episodes of hypoglycemia reported.   Patient is in bed.  She is nonverbal.   No agitation reported.   No fevers reported since she admitted to the [facility].  No cough or oxygen desaturations reported.     Sequela of CVA with residual weakness; ICA stenosis; multiple strokes with deconditioning;  dysphagia; patient nonverbal:  Patient has corepack in place; has left-sided weakness and contracture.  ICA stenosis was found during neuro work-up at [facility]    ROBERT on CKD:   Treated with IV fluids in the hospital.   Currently has corepack in place.     Benign essential hypertension:  On metoprolol.  Recent /65.      Unstageable sacral decubitus ulcer:  General surgery was consulted in the hospital for sacral decubitus ulcer that is unstageable due to overlying eschar.  Wound was not draining.  Wound size was documented approximately as 10 cm x 8 cm.  Plan for wound is daily wound dressing change with Dakin wet-to-dry twice a day.  Currently at the [facility] and is being followed by in-house wound team.    Vascular dementia; agitation:  On risperidone.  No agitation reported since patient has arrived to the [facility].    Malnutrition of mild degree:  On Corpak feeds via right nostril.  On Isosource 1.5 continuous feeds at 50 cc an hour.    Immobility; generalized muscle weakness:  Patient is bedbound.  Unable to ambulate.          Reviewed [facility] and [place] hospital records.  Reviewed   EMR.  Reviewed medical, social, surgical and family history.  Reviewed all current medications and performed medication reconciliation.  Reviewed vital signs AND recent blood work results.   Reviewed [facility] Documentation  Discussed patient with nursing and NP Kat.    ROS: Limited ROS due to patient's mentation and she is nonverbal; Negative unless noted in HPI.     Labs:  BMP: Date: 2023 Na 150 K 4.6 Cr 1.1 BUN 63 glucose 234 Ca 8.5 GFR 61  CBC: Date: 2023 WBC 9.6 Hgb 8.4 hematocrit 26.1 platelets 256      Surgical History   · History of Pilonidal Cyst Resection    Family History  Mother    · Family history of    · Family history of cataracts (V19.19) (Z83.518)   · Family history of diabetes mellitus (V18.0) (Z83.3)   · Family history of glaucoma (V19.11) (Z83.511)   · Family history of hypertension (V17.49) (Z82.49)  Father    · Family history of    · Family history of diabetes mellitus (V18.0) (Z83.3)   · Family history of hypertension (V17.49) (Z82.49)  Sister    · Family history of diabetes mellitus (V18.0) (Z83.3)   · Family history of hypertension (V17.49) (Z82.49)  Brother    · Family history of    · Family history of cardiac disorder (V17.49) (Z82.49)   · Family history of colon cancer (V16.0) (Z80.0)   · multiple 1st degree relatives   · Family history of diabetes mellitus (V18.0) (Z83.3)   · Family history of hypertension (V17.49) (Z82.49)   · Family history of skin cancer (V16.8) (Z80.8)  Other    · Family history of cardiac disorder (V17.49) (Z82.49)   · Family history of colon cancer (V16.0) (Z80.0)   · multiple 1st degree relatives   · Family history of diabetes mellitus (V18.0) (Z83.3)   · Family history of hypertension (V17.49) (Z82.49)   · Family history of malignant neoplasm (V16.9) (Z80.9)  Unknown    · Family history of cardiac disorder (V17.49) (Z82.49)   · Family history of diabetes mellitus (V18.0) (Z83.3)   · Family history of hypertension  (V17.49) (Z82.49)   · Family history of kidney disease (V18.69) (Z84.1)   · Family history of liver disease (V18.59) (Z83.79)   · Family history of malignant neoplasm (V16.9) (Z80.9)    Social History   · Alcohol use (V49.89) (Z78.9)   · Current every day smoker (305.1) (F17.200)   · Does not exercise (V69.0) (Z72.3)   · Illicit drug use (305.90) (F19.90)   · History of No alcohol use   · History of Social alcohol use    Allergies  Medication    · Penicillins      Past Medical History:   Diagnosis Date    Cerebral infarction, unspecified (CMS/HCC) 08/25/2017    Left pontine stroke    Personal history of other diseases of the circulatory system     History of hypertension    Personal history of other diseases of the digestive system     History of dental problems    Personal history of other mental and behavioral disorders     History of depression    Sleep disorder, unspecified     Sleep disturbances    Unspecified disorder of nose and nasal sinuses     Sinus trouble           Past Surgical History:   Procedure Laterality Date    MR HEAD ANGIO WO IV CONTRAST  7/14/2017    MR HEAD ANGIO WO IV CONTRAST 7/14/2017 [facility] CLINICAL LEGACY    MR HEAD ANGIO WO IV CONTRAST  11/29/2022    MR HEAD ANGIO WO IV CONTRAST 11/29/2022 DOCTOR OFFICE LEGACY    MR NECK ANGIO WO IV CONTRAST  7/14/2017    MR NECK ANGIO WO IV CONTRAST 7/14/2017 [facility] CLINICAL LEGACY    MR NECK ANGIO WO IV CONTRAST  11/29/2022    MR NECK ANGIO WO IV CONTRAST 11/29/2022 DOCTOR OFFICE LEGACY    PILONIDAL CYST DRAINAGE  09/27/2017    Pilonidal Cyst Resection          Lab Results   Component Value Date    WBC 13.5 (H) 04/08/2023    HGB 9.0 (L) 04/08/2023    HCT 28.9 (L) 04/08/2023     04/08/2023    CHOL 313 (H) 11/28/2022    TRIG 248 (H) 11/28/2022    HDL 52.7 11/28/2022    ALT 8 11/28/2022    AST 8 (L) 11/28/2022     01/17/2023    K 3.7 01/17/2023     01/17/2023    CREATININE 2.07 (H) 01/17/2023    BUN 39 (H) 01/17/2023    CO2 27 01/17/2023     "TSH 1.91 01/10/2023    INR 1.0 11/28/2022    HGBA1C 9.4 (A) 01/15/2023         /65   Pulse 100   Temp 36.8 °C (98.2 °F)   Resp 16   Ht 1.626 m (5' 4\")   Wt 84.8 kg (187 lb)   SpO2 96%   BMI 32.10 kg/m²      Physical Exam  Vitals and nursing note reviewed.   Constitutional:       Appearance: She is obese. She is ill-appearing.      Comments: Nonverbal   HENT:      Head: Normocephalic.      Right Ear: External ear normal.      Left Ear: External ear normal.      Nose: Nose normal.      Mouth/Throat:      Mouth: Mucous membranes are moist.      Pharynx: Oropharynx is clear.   Eyes:      Extraocular Movements: Extraocular movements intact.      Conjunctiva/sclera: Conjunctivae normal.      Pupils: Pupils are equal, round, and reactive to light.   Cardiovascular:      Rate and Rhythm: Normal rate and regular rhythm.      Pulses: Normal pulses.      Heart sounds: Normal heart sounds.   Pulmonary:      Effort: Pulmonary effort is normal.      Breath sounds: Normal breath sounds.   Abdominal:      General: Bowel sounds are normal.      Palpations: Abdomen is soft.   Musculoskeletal:      Cervical back: Normal range of motion and neck supple.      Comments: Generalized muscle weakness.    Immobility.    Left sided weakness (present on admission)   Skin:     General: Skin is warm and dry.      Comments: Sacral wound--see in house wound team notes in [facility]   Neurological:      Mental Status: She is lethargic and disoriented.      Motor: Weakness present.      Coordination: Coordination abnormal.      Gait: Gait abnormal.      Comments: Nonverbal.    Left side contracture and weakness.   Psychiatric:         Mood and Affect: Affect is inappropriate.         Speech: She is noncommunicative.         Cognition and Memory: Cognition is impaired. Memory is impaired.         Judgment: Judgment is inappropriate.          Assessment/Plan      Ordered CMP and CBC with diff. For tomorrow.    Type 2 diabetes:   Continue insulin " glargine 15 units SQ daily and insulin lispro sliding scale.  Monitor accuchecks.  Monitor Ha1c Q 3 months.  Monitor for hypoglycemia.    Sequela of CVA with residual weakness; ICA stenosis; multiple strokes with deconditioning;  dysphagia; patient nonverbal:  Patient has corepack in place; has left-sided weakness and contracture.  ICA stenosis was found during neuro work-up at [facility]    ROBERT on CKD:   Treated with IV fluids in the hospital.   Currently has corepack in place for fluids.    Benign essential hypertension:  Continue metoprolol.  Monitor Bps.    Unstageable sacral decubitus ulcer:  Plan for wound is daily wound dressing change with Dakin wet-to-dry twice a day.  Continue to be followed by in-house wound team at the [facility].    Vascular dementia; agitation:  Continue risperidone.  Monitor for agitation.    Malnutrition of mild degree:  Continue Corpak feeds via right nostril.  Continue Isosource 1.5 continuous feeds at 50 cc an hour.    Immobility; generalized muscle weakness:  Patient is bedbound.  Unable to ambulate.  Fall prevention strategies.             Problem List Items Addressed This Visit    None  Visit Diagnoses       Type 2 diabetes mellitus with other specified complication, with long-term current use of insulin (CMS/HCC)    -  Primary    Sequela of cerebrovascular accident        Left-sided weakness        Dysphagia due to recent cerebrovascular accident        Acute kidney injury superimposed on chronic kidney disease (CMS/HCC)        Benign essential HTN        Sacral wound, sequela        Malnutrition, unspecified type (CMS/HCC)        Nonverbal        Severe vascular dementia with agitation (CMS/HCC)        Generalized muscle weakness        Immobility        History of multiple strokes              Meaghan Garcia, APRN-CNP